# Patient Record
Sex: FEMALE | Race: WHITE | Employment: FULL TIME | URBAN - METROPOLITAN AREA
[De-identification: names, ages, dates, MRNs, and addresses within clinical notes are randomized per-mention and may not be internally consistent; named-entity substitution may affect disease eponyms.]

---

## 2018-03-17 ENCOUNTER — HOSPITAL ENCOUNTER (OUTPATIENT)
Dept: PHYSICAL THERAPY | Age: 38
Discharge: HOME | End: 2018-03-31
Attending: FAMILY MEDICINE | Admitting: FAMILY MEDICINE

## 2018-04-01 ENCOUNTER — HOSPITAL ENCOUNTER (OUTPATIENT)
Dept: OTHER | Age: 38
Discharge: HOME | End: 2018-04-30
Attending: FAMILY MEDICINE | Admitting: FAMILY MEDICINE

## 2020-06-11 ENCOUNTER — HOSPITAL ENCOUNTER (OUTPATIENT)
Dept: WOMENS IMAGING | Age: 40
Discharge: HOME | End: 2020-06-11
Payer: COMMERCIAL

## 2020-06-11 PROCEDURE — 77067 SCR MAMMO BI INCL CAD: CPT

## 2020-08-26 ENCOUNTER — OFFICE VISIT (OUTPATIENT)
Dept: FAMILY MEDICINE CLINIC | Age: 40
End: 2020-08-26
Payer: COMMERCIAL

## 2020-08-26 VITALS
WEIGHT: 213.2 LBS | DIASTOLIC BLOOD PRESSURE: 82 MMHG | BODY MASS INDEX: 36.4 KG/M2 | TEMPERATURE: 98.1 F | HEART RATE: 76 BPM | HEIGHT: 64 IN | SYSTOLIC BLOOD PRESSURE: 118 MMHG | OXYGEN SATURATION: 98 %

## 2020-08-26 PROCEDURE — 99203 OFFICE O/P NEW LOW 30 MIN: CPT | Performed by: NURSE PRACTITIONER

## 2020-08-26 ASSESSMENT — PATIENT HEALTH QUESTIONNAIRE - PHQ9
2. FEELING DOWN, DEPRESSED OR HOPELESS: 0
SUM OF ALL RESPONSES TO PHQ9 QUESTIONS 1 & 2: 0
SUM OF ALL RESPONSES TO PHQ QUESTIONS 1-9: 0
1. LITTLE INTEREST OR PLEASURE IN DOING THINGS: 0
SUM OF ALL RESPONSES TO PHQ QUESTIONS 1-9: 0

## 2020-08-26 ASSESSMENT — ENCOUNTER SYMPTOMS
RESPIRATORY NEGATIVE: 1
ALLERGIC/IMMUNOLOGIC NEGATIVE: 1
EYES NEGATIVE: 1
CONSTIPATION: 1

## 2020-08-26 NOTE — PROGRESS NOTES
2020    Karel York (:  1980) is a 38 y.o. female, here for evaluation of the following medical concerns:    Patient is here to establish care. She is a , mother of 3 with children ranging from 7 to 13 years. History of hemorrhoidectomy 5 years ago and has no complaints with hemorrhoids since then. History of  with her last child due to nuchal cord complications and low heart rate. She has experienced 4 miscarriages with unknown causes. History of parasitic infection after going to [facility]. Digestive issues for years after. Does not have regular bowel movements, goes 1 to 2 times weekly. Tried multiple stool softners with no effect. Reports seeing GI doctor, unable to remember which one. Had colonoscopy, negative findings. Patient concerned about mental health issues and substance abuse issues in family. Father was addicted to crack, was clean for 11 years and then  of Lung Cancer, unknown type. Has a counselor after father passed away now that she sees in a neighboring town that she is happy with. Patient experienced post partum depression after last child and was prescribed Effexor and Trazodone from her last PCP to help with insomnia. Irregular menstraul cycle every 3 to 5 weeks lasting about 4 days. Denies heavy bleeding or pain with periods. Does report \"mood swings\" before period is to begin. Reports she has not had a pap smear since her last child was born, patient will be scheduling an appointment for a pap smear. Reports they appear to be further apart. Does not have current gynecologist.       Review of Systems   Constitutional: Negative. HENT: Negative. Eyes: Negative. Respiratory: Negative. Cardiovascular: Negative. Gastrointestinal: Positive for constipation (Chronic constipation). Endocrine: Negative. Genitourinary: Positive for menstrual problem (Irregular periods. ). Musculoskeletal: Negative. Skin: Negative. Allergic/Immunologic: Negative. Neurological: Negative. Hematological: Negative. Psychiatric/Behavioral:        History of chronic depression and OCD. Prior to Visit Medications    Medication Sig Taking?  Authorizing Provider   venlafaxine (EFFEXOR XR) 150 MG extended release capsule TAKE ONE CAPSULE BY MOUTH EVERY DAY Yes Boni Raymundo PA-C   traZODone (DESYREL) 50 MG tablet TAKE 1/2-1 TABLET BY MOUTH AT BEDTIME Yes Boni Raymundo PA-C        No Known Allergies    Past Medical History:   Diagnosis Date    Clinical depression     Hyperlipidemia 04/2014        Insomnia     Mental disorder     depression    OCD (obsessive compulsive disorder)     Postpartum depression     after second child       Past Surgical History:   Procedure Laterality Date    HEMORRHOID SURGERY      TONSILLECTOMY         Social History     Socioeconomic History    Marital status:      Spouse name: Not on file    Number of children: Not on file    Years of education: Not on file    Highest education level: Not on file   Occupational History    Not on file   Social Needs    Financial resource strain: Not on file    Food insecurity     Worry: Not on file     Inability: Not on file    Transportation needs     Medical: Not on file     Non-medical: Not on file   Tobacco Use    Smoking status: Former Smoker    Smokeless tobacco: Never Used   Substance and Sexual Activity    Alcohol use: No    Drug use: No    Sexual activity: Yes   Lifestyle    Physical activity     Days per week: Not on file     Minutes per session: Not on file    Stress: Not on file   Relationships    Social connections     Talks on phone: Not on file     Gets together: Not on file     Attends [employer] service: Not on file     Active member of club or organization: Not on file     Attends meetings of clubs or organizations: Not on file     Relationship status: Not on file    Intimate partner violence     Fear of current or ex Cardiovascular:      Rate and Rhythm: Normal rate and regular rhythm. Pulses: Normal pulses. Heart sounds: Normal heart sounds. Pulmonary:      Effort: Pulmonary effort is normal.      Breath sounds: Normal breath sounds. Abdominal:      General: Bowel sounds are normal.      Palpations: Abdomen is soft. Musculoskeletal: Normal range of motion. Skin:     General: Skin is warm and dry. Neurological:      General: No focal deficit present. Mental Status: She is alert and oriented to person, place, and time. Psychiatric:         Attention and Perception: Attention and perception normal.         Mood and Affect: Mood and affect normal.         Speech: Speech normal.         Behavior: Behavior normal. Behavior is cooperative. Thought Content: Thought content normal.         Judgment: Judgment normal.         ASSESSMENT/PLAN:  1. Encounter to establish care    2. Depression, unspecified depression type    3. Mood swings  - FOLLICLE STIMULATING HORMONE; Future  - LUTEINIZING HORMONE; Future    4. Irregular periods/menstrual cycles  - CBC; Future  - COMPREHENSIVE METABOLIC PANEL; Future  - TSH without Reflex; Future  - FOLLICLE STIMULATING HORMONE; Future  - LUTEINIZING HORMONE; Future    5. Screening for cholesterol level  - LIPID PANEL; Future    Will return for female exam, pap smear  Will also have labs drawn at the time of female exam    Return in about 6 weeks (around 10/7/2020). An  electronic signature was used to authenticate this note.     --Dipika Batch on 8/26/2020 at 3:42 PM

## 2020-08-28 ENCOUNTER — OFFICE VISIT (OUTPATIENT)
Dept: FAMILY MEDICINE CLINIC | Age: 40
End: 2020-08-28
Payer: COMMERCIAL

## 2020-08-28 VITALS
DIASTOLIC BLOOD PRESSURE: 76 MMHG | TEMPERATURE: 98.2 F | HEIGHT: 64 IN | HEART RATE: 70 BPM | BODY MASS INDEX: 36.02 KG/M2 | SYSTOLIC BLOOD PRESSURE: 110 MMHG | OXYGEN SATURATION: 98 % | WEIGHT: 211 LBS

## 2020-08-28 LAB
A/G RATIO: 1.7 (ref 1.1–2.2)
ALBUMIN SERPL-MCNC: 4.7 G/DL (ref 3.4–5)
ALP BLD-CCNC: 80 U/L (ref 40–129)
ALT SERPL-CCNC: 26 U/L (ref 10–40)
ANION GAP SERPL CALCULATED.3IONS-SCNC: 13 MMOL/L (ref 3–16)
AST SERPL-CCNC: 25 U/L (ref 15–37)
BILIRUB SERPL-MCNC: 0.3 MG/DL (ref 0–1)
BUN BLDV-MCNC: 13 MG/DL (ref 7–20)
CALCIUM SERPL-MCNC: 10.2 MG/DL (ref 8.3–10.6)
CHLORIDE BLD-SCNC: 101 MMOL/L (ref 99–110)
CHOLESTEROL, TOTAL: 224 MG/DL (ref 0–199)
CO2: 26 MMOL/L (ref 21–32)
CREAT SERPL-MCNC: 0.9 MG/DL (ref 0.6–1.1)
FOLLICLE STIMULATING HORMONE: 5.6 MIU/ML
GFR AFRICAN AMERICAN: >60
GFR NON-AFRICAN AMERICAN: >60
GLOBULIN: 2.8 G/DL
GLUCOSE BLD-MCNC: 104 MG/DL (ref 70–99)
HCT VFR BLD CALC: 44.1 % (ref 36–48)
HDLC SERPL-MCNC: 46 MG/DL (ref 40–60)
HEMOGLOBIN: 14.6 G/DL (ref 12–16)
LDL CHOLESTEROL CALCULATED: 153 MG/DL
LUTEINIZING HORMONE: 9.7 MIU/ML
MCH RBC QN AUTO: 29.8 PG (ref 26–34)
MCHC RBC AUTO-ENTMCNC: 33.2 G/DL (ref 31–36)
MCV RBC AUTO: 89.9 FL (ref 80–100)
PDW BLD-RTO: 14.3 % (ref 12.4–15.4)
PLATELET # BLD: 227 K/UL (ref 135–450)
PLATELET SLIDE REVIEW: NORMAL
PMV BLD AUTO: 9.7 FL (ref 5–10.5)
POTASSIUM SERPL-SCNC: 4 MMOL/L (ref 3.5–5.1)
RBC # BLD: 4.9 M/UL (ref 4–5.2)
SODIUM BLD-SCNC: 140 MMOL/L (ref 136–145)
TOTAL PROTEIN: 7.5 G/DL (ref 6.4–8.2)
TRIGL SERPL-MCNC: 125 MG/DL (ref 0–150)
TSH SERPL DL<=0.05 MIU/L-ACNC: 1.78 UIU/ML (ref 0.27–4.2)
VLDLC SERPL CALC-MCNC: 25 MG/DL
WBC # BLD: 5.7 K/UL (ref 4–11)

## 2020-08-28 PROCEDURE — 99396 PREV VISIT EST AGE 40-64: CPT | Performed by: NURSE PRACTITIONER

## 2020-08-28 ASSESSMENT — ENCOUNTER SYMPTOMS
GASTROINTESTINAL NEGATIVE: 1
CHEST TIGHTNESS: 0
SHORTNESS OF BREATH: 0
WHEEZING: 0
COUGH: 0

## 2020-08-28 NOTE — PROGRESS NOTES
Marcos Oviedo  1980  38 y.o. SUBJECT JANES:    Chief Complaint   Patient presents with   Penelope Osman is a 38year old female who is in for a female exam. She denies painful intercourse, unusual vaginal discharge, breasts tenderness or nipple discharge. She states she does monthly breasts self exams. She states her current medications are working well for her depression. She is complaining of bilateral ankle and foot pain. She states she has noted the discomfort at bedtime, usually after doing her evening walk. She states she has a long history of gymnastics, both in school and competitive. She denies injury to her ankles or feet. She states she was told that she has an extra bone, inner feet, at the arch. She states getting up and walking around, stretching helps the discomfort. The discomfort is not daily. She has not tried anti-inflammatory medication or stretches. Current Outpatient Medications on File Prior to Visit   Medication Sig Dispense Refill    venlafaxine (EFFEXOR XR) 150 MG extended release capsule TAKE ONE CAPSULE BY MOUTH EVERY DAY 30 capsule 3    traZODone (DESYREL) 50 MG tablet TAKE 1/2-1 TABLET BY MOUTH AT BEDTIME 30 tablet 3     No current facility-administered medications on file prior to visit.         Past Medical History:   Diagnosis Date    Clinical depression     Hyperlipidemia 04/2014        Insomnia     Mental disorder     depression    OCD (obsessive compulsive disorder)     Postpartum depression     after second child     Past Surgical History:   Procedure Laterality Date    HEMORRHOID SURGERY      TONSILLECTOMY       Family History   Problem Relation Age of Onset    Arthritis Mother     High Cholesterol Mother     Thyroid Disease Mother     Depression Father     Mental Illness Father     Substance Abuse Father     High Blood Pressure Maternal Grandmother     Arthritis Maternal Grandfather     Diabetes Maternal Grandfather  High Blood Pressure Maternal Grandfather     Cancer Paternal Grandmother     High Blood Pressure Paternal Grandmother     High Blood Pressure Paternal Grandfather     High Cholesterol Paternal Grandfather      Social History     Socioeconomic History    Marital status:      Spouse name: Not on file    Number of children: Not on file    Years of education: Not on file    Highest education level: Not on file   Occupational History    Not on file   Social Needs    Financial resource strain: Not on file    Food insecurity     Worry: Not on file     Inability: Not on file   [employer] needs     Medical: Not on file     Non-medical: Not on file   Tobacco Use    Smoking status: Former Smoker    Smokeless tobacco: Never Used   Substance and Sexual Activity    Alcohol use: No    Drug use: No    Sexual activity: Yes   Lifestyle    Physical activity     Days per week: Not on file     Minutes per session: Not on file    Stress: Not on file   Relationships    Social connections     Talks on phone: Not on file     Gets together: Not on file     Attends [employer] service: Not on file     Active member of club or organization: Not on file     Attends meetings of clubs or organizations: Not on file     Relationship status: Not on file    Intimate partner violence     Fear of current or ex partner: Not on file     Emotionally abused: Not on file     Physically abused: Not on file     Forced sexual activity: Not on file   Other Topics Concern    Not on file   Social History Narrative    Not on file       Review of Systems   Constitutional: Negative for activity change, appetite change, chills, diaphoresis, fatigue, fever and unexpected weight change. HENT: Negative. Respiratory: Negative for cough, chest tightness, shortness of breath and wheezing. Cardiovascular: Negative for chest pain and palpitations. Gastrointestinal: Negative.     Genitourinary: Positive for menstrual problem (irregular). Negative for difficulty urinating, dyspareunia, dysuria, enuresis, flank pain, frequency, genital sores, pelvic pain, vaginal bleeding, vaginal discharge and vaginal pain. Neurological: Negative. Psychiatric/Behavioral: Negative. OBJECTIVE:     /76 (Site: Right Upper Arm, Position: Sitting, Cuff Size: Large Adult)   Pulse 70   Temp 98.2 °F (36.8 °C)   Ht 5' 4\" (1.626 m)   Wt 211 lb (95.7 kg)   SpO2 98%   BMI 36.22 kg/m²     Physical Exam  Vitals signs reviewed. Exam conducted with a chaperone present. Constitutional:       General: She is not in acute distress. Appearance: Normal appearance. She is well-developed. She is not ill-appearing or diaphoretic. HENT:      Head: Normocephalic and atraumatic. Right Ear: External ear normal.      Left Ear: External ear normal.   Eyes:      General: No scleral icterus. Conjunctiva/sclera: Conjunctivae normal.      Pupils: Pupils are equal, round, and reactive to light. Neck:      Musculoskeletal: Normal range of motion and neck supple. No neck rigidity or muscular tenderness. Cardiovascular:      Rate and Rhythm: Normal rate and regular rhythm. Heart sounds: Normal heart sounds. Pulmonary:      Effort: Pulmonary effort is normal.      Breath sounds: Normal breath sounds. Abdominal:      General: Abdomen is flat. Bowel sounds are normal. There is no distension. Palpations: Abdomen is soft. There is no mass. Tenderness: There is no abdominal tenderness. Genitourinary:     General: Normal vulva. Exam position: Lithotomy position. Pubic Area: No rash or pubic lice. Labia:         Right: No tenderness. Left: No tenderness. Vagina: No vaginal discharge. Comments: Small amount of bleeding with sampling endocervical.  Musculoskeletal: Normal range of motion. Lymphadenopathy:      Cervical: No cervical adenopathy. Lower Body: No right inguinal adenopathy.  No left inguinal adenopathy. Skin:     General: Skin is warm and dry. Neurological:      Mental Status: She is alert and oriented to person, place, and time. Psychiatric:         Behavior: Behavior normal.         Thought Content: Thought content normal.         Judgment: Judgment normal.         No results found for requested labs within last 30 days. Hemoglobin A1C (%)   Date Value   04/15/2015 5.6     LDL Calculated (mg/dL)   Date Value   04/15/2014 144       Lab Results   Component Value Date    WBC 5.9 09/15/2015    WBC 12.7 06/27/2013    WBC 11.6 06/26/2013    NEUTROABS 3.7 09/15/2015    HGB 13.0 09/15/2015    HGB 7.5 06/27/2013    HGB 10.4 06/26/2013    HCT 38.5 09/15/2015    HCT 23.0 06/27/2013    HCT 31.6 06/26/2013    MCV 78 09/15/2015    MCV 77.3 06/27/2013    MCV 76.9 06/26/2013     09/15/2015     06/27/2013     06/26/2013    SEGSABS 9.7 06/27/2013    SEGSABS 10.7 03/27/2013    SEGSABS 7.0 12/04/2011    LYMPHSABS 1.8 09/15/2015    MONOSABS 0.3 09/15/2015    EOSABS 0.1 09/15/2015    BASOSABS 0.0 09/15/2015     Lab Results   Component Value Date    TSH 1.680 09/15/2015     Lab Results   Component Value Date    LABALBU 4.6 09/15/2015    BILITOT 0.4 09/15/2015    AST 17 09/15/2015    ALT 13 09/15/2015    ALKPHOS 96 09/15/2015             No results found for this visit on 08/28/20. ASSESSMENT AND PLAN:     1. Encounter for gynecological examination with Papanicolaou smear of cervix  - PAP SMEAR    2. Chronic ankle pain, bilateral    3. Mood swings  - FOLLICLE STIMULATING HORMONE  - LUTEINIZING HORMONE    4. Irregular periods/menstrual cycles  - FOLLICLE STIMULATING HORMONE  - LUTEINIZING HORMONE  - TSH without Reflex  - COMPREHENSIVE METABOLIC PANEL  - CBC    5.  Depression, unspecified depression type  - TSH without Reflex  - COMPREHENSIVE METABOLIC PANEL    6. Screening for cholesterol level  - LIPID PANEL    Fluids, rest  Consider walking in more supportive shoe  Foot soaks after

## 2020-09-01 ENCOUNTER — TELEPHONE (OUTPATIENT)
Dept: FAMILY MEDICINE CLINIC | Age: 40
End: 2020-09-01

## 2020-09-01 NOTE — TELEPHONE ENCOUNTER
Called patient regarding lab results. Advised to decrease red meat, walk at least 30 minutes 5 days a week. Verbalized understanding and agreement with plan.

## 2020-09-18 RX ORDER — DOCUSATE SODIUM 100 MG/1
100 CAPSULE, LIQUID FILLED ORAL 2 TIMES DAILY
Qty: 60 CAPSULE | Refills: 0 | Status: SHIPPED | OUTPATIENT
Start: 2020-09-18 | End: 2020-10-18

## 2020-11-16 RX ORDER — TRAZODONE HYDROCHLORIDE 50 MG/1
TABLET ORAL
Qty: 90 TABLET | Refills: 0 | Status: SHIPPED | OUTPATIENT
Start: 2020-11-16 | End: 2021-01-11 | Stop reason: SDUPTHER

## 2020-11-16 RX ORDER — VENLAFAXINE HYDROCHLORIDE 150 MG/1
CAPSULE, EXTENDED RELEASE ORAL
Qty: 90 CAPSULE | Refills: 0 | Status: SHIPPED | OUTPATIENT
Start: 2020-11-16 | End: 2021-01-11 | Stop reason: SDUPTHER

## 2020-11-17 RX ORDER — DOCUSATE SODIUM 100 MG/1
100 CAPSULE, LIQUID FILLED ORAL 2 TIMES DAILY PRN
Qty: 30 CAPSULE | Refills: 0 | Status: SHIPPED | OUTPATIENT
Start: 2020-11-17 | End: 2020-12-02

## 2021-01-11 ENCOUNTER — OFFICE VISIT (OUTPATIENT)
Dept: FAMILY MEDICINE CLINIC | Age: 41
End: 2021-01-11
Payer: COMMERCIAL

## 2021-01-11 VITALS
WEIGHT: 201.8 LBS | SYSTOLIC BLOOD PRESSURE: 118 MMHG | HEART RATE: 90 BPM | DIASTOLIC BLOOD PRESSURE: 80 MMHG | OXYGEN SATURATION: 98 % | BODY MASS INDEX: 34.64 KG/M2 | TEMPERATURE: 96.9 F

## 2021-01-11 DIAGNOSIS — F32.A ANXIETY AND DEPRESSION: Primary | ICD-10-CM

## 2021-01-11 DIAGNOSIS — K59.00 CONSTIPATION, UNSPECIFIED CONSTIPATION TYPE: ICD-10-CM

## 2021-01-11 DIAGNOSIS — Z76.0 MEDICATION REFILL: ICD-10-CM

## 2021-01-11 DIAGNOSIS — F41.9 ANXIETY AND DEPRESSION: Primary | ICD-10-CM

## 2021-01-11 PROCEDURE — 99213 OFFICE O/P EST LOW 20 MIN: CPT | Performed by: NURSE PRACTITIONER

## 2021-01-11 RX ORDER — TRAZODONE HYDROCHLORIDE 50 MG/1
TABLET ORAL
Qty: 90 TABLET | Refills: 1 | Status: SHIPPED | OUTPATIENT
Start: 2021-01-11 | End: 2021-06-25 | Stop reason: SDUPTHER

## 2021-01-11 RX ORDER — VENLAFAXINE HYDROCHLORIDE 150 MG/1
CAPSULE, EXTENDED RELEASE ORAL
Qty: 90 CAPSULE | Refills: 1 | Status: SHIPPED | OUTPATIENT
Start: 2021-01-11 | End: 2021-06-25 | Stop reason: SDUPTHER

## 2021-01-11 ASSESSMENT — ENCOUNTER SYMPTOMS
TROUBLE SWALLOWING: 0
COUGH: 0
ABDOMINAL PAIN: 0
DIARRHEA: 0
NAUSEA: 0
CONSTIPATION: 1
WHEEZING: 0
ANAL BLEEDING: 0
SORE THROAT: 0
SHORTNESS OF BREATH: 0
RECTAL PAIN: 0
BLOOD IN STOOL: 0
RHINORRHEA: 0
CHOKING: 0
CHEST TIGHTNESS: 0

## 2021-01-11 ASSESSMENT — PATIENT HEALTH QUESTIONNAIRE - PHQ9
SUM OF ALL RESPONSES TO PHQ QUESTIONS 1-9: 0
SUM OF ALL RESPONSES TO PHQ9 QUESTIONS 1 & 2: 0
2. FEELING DOWN, DEPRESSED OR HOPELESS: 0
1. LITTLE INTEREST OR PLEASURE IN DOING THINGS: 0
SUM OF ALL RESPONSES TO PHQ QUESTIONS 1-9: 0

## 2021-01-11 NOTE — PROGRESS NOTES
Eugenio Serrano  1980  38 y.o. SUBJECT JANES:    Chief Complaint   Patient presents with    Medication Refill       HPI    Maegan Larson is a 38year old female who is in for follow up. She states she has been on the antidepressants for almost 10 years and the treatments are working well. She states her problem of constipation is still a concern. She states she has had a problem for some time. She states she has used stool softeners without relief, many times the stool is a large amount, causing fissures and hemorrhoids. She denies abdominal pain, nausea or vomiting. She gives a past history of having a parasite in her intestine in 2004 that she contracted while in [place]. She states she had antibiotic treatment. She states she had a colonoscopy a few years ago and no abnormalities were found. Current Outpatient Medications on File Prior to Visit   Medication Sig Dispense Refill    Probiotic Product (PROBIOTIC PO) Take by mouth daily       No current facility-administered medications on file prior to visit.         Past Medical History:   Diagnosis Date    Clinical depression     Hyperlipidemia 04/2014        Insomnia     Mental disorder     depression    OCD (obsessive compulsive disorder)     Postpartum depression     after second child     Past Surgical History:   Procedure Laterality Date    HEMORRHOID SURGERY      TONSILLECTOMY       Family History   Problem Relation Age of Onset    Arthritis Mother     High Cholesterol Mother     Thyroid Disease Mother     Depression Father     Mental Illness Father     Substance Abuse Father     High Blood Pressure Maternal Grandmother     Arthritis Maternal Grandfather     Diabetes Maternal Grandfather     High Blood Pressure Maternal Grandfather     Cancer Paternal Grandmother     High Blood Pressure Paternal Grandmother     High Blood Pressure Paternal Grandfather     High Cholesterol Paternal Grandfather      Social History Socioeconomic History    Marital status:      Spouse name: Not on file    Number of children: Not on file    Years of education: Not on file    Highest education level: Not on file   Occupational History    Not on file   Social Needs    Financial resource strain: Not on file    Food insecurity     Worry: Not on file     Inability: Not on file    Transportation needs     Medical: Not on file     Non-medical: Not on file   Tobacco Use    Smoking status: Former Smoker    Smokeless tobacco: Never Used   Substance and Sexual Activity    Alcohol use: No    Drug use: No    Sexual activity: Yes   Lifestyle    Physical activity     Days per week: Not on file     Minutes per session: Not on file    Stress: Not on file   Relationships    Social connections     Talks on phone: Not on file     Gets together: Not on file     Attends [employer] service: Not on file     Active member of club or organization: Not on file     Attends meetings of clubs or organizations: Not on file     Relationship status: Not on file    Intimate partner violence     Fear of current or ex partner: Not on file     Emotionally abused: Not on file     Physically abused: Not on file     Forced sexual activity: Not on file   Other Topics Concern    Not on file   Social History Narrative    Not on file       Review of Systems   Constitutional: Negative for activity change, appetite change, chills, diaphoresis, fatigue, fever and unexpected weight change. HENT: Negative for congestion, rhinorrhea, sore throat and trouble swallowing. Respiratory: Negative for cough, choking, chest tightness, shortness of breath and wheezing. Cardiovascular: Negative for chest pain, palpitations and leg swelling. Gastrointestinal: Positive for constipation. Negative for abdominal pain, anal bleeding, blood in stool, diarrhea, nausea and rectal pain. Genitourinary: Negative. Musculoskeletal: Negative. Skin: Negative. Neurological: Negative. Psychiatric/Behavioral: Negative. OBJECTIVE:     /80 (Site: Right Upper Arm, Position: Sitting, Cuff Size: Medium Adult)   Pulse 90   Temp 96.9 °F (36.1 °C) (Infrared)   Wt 201 lb 12.8 oz (91.5 kg)   SpO2 98%   BMI 34.64 kg/m²     Physical Exam  Vitals signs reviewed. Constitutional:       General: She is not in acute distress. Appearance: Normal appearance. She is well-developed. She is not ill-appearing or diaphoretic. HENT:      Head: Normocephalic and atraumatic. Eyes:      General: No scleral icterus. Conjunctiva/sclera: Conjunctivae normal.      Pupils: Pupils are equal, round, and reactive to light. Neck:      Musculoskeletal: Normal range of motion and neck supple. No neck rigidity or muscular tenderness. Cardiovascular:      Rate and Rhythm: Normal rate and regular rhythm. Heart sounds: Normal heart sounds. No murmur. No friction rub. No gallop. Pulmonary:      Effort: Pulmonary effort is normal. No respiratory distress. Breath sounds: Normal breath sounds. No stridor. No wheezing. Abdominal:      Palpations: Abdomen is soft. Musculoskeletal: Normal range of motion. Right lower leg: No edema. Left lower leg: No edema. Lymphadenopathy:      Cervical: No cervical adenopathy. Skin:     General: Skin is warm and dry. Neurological:      Mental Status: She is alert and oriented to person, place, and time. Psychiatric:         Behavior: Behavior normal.         Thought Content: Thought content normal.         Judgment: Judgment normal.         No results found for requested labs within last 30 days.      Hemoglobin A1C (%)   Date Value   04/15/2015 5.6     LDL Calculated (mg/dL)   Date Value   08/28/2020 153 (H)       Lab Results   Component Value Date    WBC 5.7 08/28/2020    WBC 5.9 09/15/2015    WBC 12.7 06/27/2013    NEUTROABS 3.7 09/15/2015    HGB 14.6 08/28/2020    HGB 13.0 09/15/2015    HGB 7.5 06/27/2013 HCT 44.1 08/28/2020    HCT 38.5 09/15/2015    HCT 23.0 06/27/2013    MCV 89.9 08/28/2020    MCV 78 09/15/2015    MCV 77.3 06/27/2013     08/28/2020     09/15/2015     06/27/2013    SEGSABS 9.7 06/27/2013    SEGSABS 10.7 03/27/2013    SEGSABS 7.0 12/04/2011    LYMPHSABS 1.8 09/15/2015    MONOSABS 0.3 09/15/2015    EOSABS 0.1 09/15/2015    BASOSABS 0.0 09/15/2015     Lab Results   Component Value Date    TSH 1.78 08/28/2020     Lab Results   Component Value Date    LABALBU 4.7 08/28/2020    BILITOT 0.3 08/28/2020    AST 25 08/28/2020    ALT 26 08/28/2020    ALKPHOS 80 08/28/2020             No results found for this visit on 01/11/21. ASSESSMENT AND PLAN:     1. Anxiety and depression  - stable  - traZODone (DESYREL) 50 MG tablet; TAKE 1/2-1 TABLET BY MOUTH AT BEDTIME  Dispense: 90 tablet; Refill: 1  - venlafaxine (EFFEXOR XR) 150 MG extended release capsule; TAKE ONE CAPSULE BY MOUTH EVERY DAY  Dispense: 90 capsule; Refill: 1    2. Medication refill  - traZODone (DESYREL) 50 MG tablet; TAKE 1/2-1 TABLET BY MOUTH AT BEDTIME  Dispense: 90 tablet; Refill: 1  - venlafaxine (EFFEXOR XR) 150 MG extended release capsule; TAKE ONE CAPSULE BY MOUTH EVERY DAY  Dispense: 90 capsule; Refill: 1    3. Constipation, unspecified constipation type  - continue probiotic  - will try Miralax and follow with daily stool softener  - increase fiber in diet to at least 25 grams a day  - if no resolve will refer to GI    Verbalized understanding and agreement with plan      Return in about 6 months (around 7/11/2021). Care discussed with patient. Patient educated on signs and symptoms of exacerbation and when to seek further medical attention. Advised to call for any problems, questions, or concerns. Patient verbalizes understanding and agrees with plan. Medications reviewed and reconciled. Continue current medications. Appropriate prescriptions are ordered. Risks and benefits of meds are discussed.      After visit summary provided.

## 2021-06-25 ENCOUNTER — OFFICE VISIT (OUTPATIENT)
Dept: FAMILY MEDICINE CLINIC | Age: 41
End: 2021-06-25
Payer: COMMERCIAL

## 2021-06-25 VITALS
TEMPERATURE: 97.6 F | DIASTOLIC BLOOD PRESSURE: 76 MMHG | HEIGHT: 64 IN | BODY MASS INDEX: 34.62 KG/M2 | OXYGEN SATURATION: 98 % | HEART RATE: 75 BPM | SYSTOLIC BLOOD PRESSURE: 118 MMHG | WEIGHT: 202.8 LBS

## 2021-06-25 DIAGNOSIS — F41.9 ANXIETY AND DEPRESSION: ICD-10-CM

## 2021-06-25 DIAGNOSIS — F32.A ANXIETY AND DEPRESSION: ICD-10-CM

## 2021-06-25 DIAGNOSIS — K59.00 CONSTIPATION, UNSPECIFIED CONSTIPATION TYPE: Primary | ICD-10-CM

## 2021-06-25 DIAGNOSIS — Z76.0 MEDICATION REFILL: ICD-10-CM

## 2021-06-25 PROCEDURE — 99213 OFFICE O/P EST LOW 20 MIN: CPT | Performed by: NURSE PRACTITIONER

## 2021-06-25 PROCEDURE — G8417 CALC BMI ABV UP PARAM F/U: HCPCS | Performed by: NURSE PRACTITIONER

## 2021-06-25 PROCEDURE — 1036F TOBACCO NON-USER: CPT | Performed by: NURSE PRACTITIONER

## 2021-06-25 PROCEDURE — G8427 DOCREV CUR MEDS BY ELIG CLIN: HCPCS | Performed by: NURSE PRACTITIONER

## 2021-06-25 RX ORDER — TRAZODONE HYDROCHLORIDE 50 MG/1
TABLET ORAL
Qty: 90 TABLET | Refills: 1 | Status: SHIPPED | OUTPATIENT
Start: 2021-06-25

## 2021-06-25 RX ORDER — BIOTIN 2500 MCG
1 CAPSULE ORAL DAILY
COMMUNITY

## 2021-06-25 RX ORDER — VENLAFAXINE HYDROCHLORIDE 150 MG/1
CAPSULE, EXTENDED RELEASE ORAL
Qty: 90 CAPSULE | Refills: 1 | Status: SHIPPED | OUTPATIENT
Start: 2021-06-25

## 2021-06-25 ASSESSMENT — PATIENT HEALTH QUESTIONNAIRE - PHQ9
8. MOVING OR SPEAKING SO SLOWLY THAT OTHER PEOPLE COULD HAVE NOTICED. OR THE OPPOSITE, BEING SO FIGETY OR RESTLESS THAT YOU HAVE BEEN MOVING AROUND A LOT MORE THAN USUAL: 0
1. LITTLE INTEREST OR PLEASURE IN DOING THINGS: 0
9. THOUGHTS THAT YOU WOULD BE BETTER OFF DEAD, OR OF HURTING YOURSELF: 0
10. IF YOU CHECKED OFF ANY PROBLEMS, HOW DIFFICULT HAVE THESE PROBLEMS MADE IT FOR YOU TO DO YOUR WORK, TAKE CARE OF THINGS AT HOME, OR GET ALONG WITH OTHER PEOPLE: 0
SUM OF ALL RESPONSES TO PHQ QUESTIONS 1-9: 4
7. TROUBLE CONCENTRATING ON THINGS, SUCH AS READING THE NEWSPAPER OR WATCHING TELEVISION: 0
SUM OF ALL RESPONSES TO PHQ QUESTIONS 1-9: 4
SUM OF ALL RESPONSES TO PHQ9 QUESTIONS 1 & 2: 0
6. FEELING BAD ABOUT YOURSELF - OR THAT YOU ARE A FAILURE OR HAVE LET YOURSELF OR YOUR FAMILY DOWN: 0
5. POOR APPETITE OR OVEREATING: 2
3. TROUBLE FALLING OR STAYING ASLEEP: 1
4. FEELING TIRED OR HAVING LITTLE ENERGY: 1
SUM OF ALL RESPONSES TO PHQ QUESTIONS 1-9: 4
2. FEELING DOWN, DEPRESSED OR HOPELESS: 0

## 2021-06-25 ASSESSMENT — ENCOUNTER SYMPTOMS
SHORTNESS OF BREATH: 0
GASTROINTESTINAL NEGATIVE: 1
CHEST TIGHTNESS: 0
COUGH: 0
WHEEZING: 0

## 2021-06-25 NOTE — PROGRESS NOTES
Michael Kiara  1980  42 y.o. SUBJECT JANES:    Chief Complaint   Patient presents with   Deneen Mcghee       MIN Cheng is a 42year old female who is in  For follow up. She states medications continue to help with anxiety and sleep problems. She states she is walking most mornings which has also helped with starting her day, less anxious. She has been monitoring the amount of red meat, decreased and is eating more fruits and vegetables. She states the constipation is much less with the changes in foods. She denies chest pain, shortness of breath, abdominal pain, nausea or blood in stools. Current Outpatient Medications on File Prior to Visit   Medication Sig Dispense Refill    Multiple Vitamin (MULTI-DAY VITAMINS PO) Take 1 tablet by mouth daily      Biotin 2500 MCG CAPS Take 1 capsule by mouth daily       No current facility-administered medications on file prior to visit.        Past Medical History:   Diagnosis Date    Clinical depression     Hyperlipidemia 04/2014        Insomnia     Mental disorder     depression    OCD (obsessive compulsive disorder)     Postpartum depression     after second child     Past Surgical History:   Procedure Laterality Date    HEMORRHOID SURGERY      TONSILLECTOMY       Family History   Problem Relation Age of Onset    Arthritis Mother     High Cholesterol Mother     Thyroid Disease Mother     Depression Father     Mental Illness Father     Substance Abuse Father     High Blood Pressure Maternal Grandmother     Arthritis Maternal Grandfather     Diabetes Maternal Grandfather     High Blood Pressure Maternal Grandfather     Cancer Paternal Grandmother     High Blood Pressure Paternal Grandmother     High Blood Pressure Paternal Grandfather     High Cholesterol Paternal Grandfather      Social History     Socioeconomic History    Marital status:      Spouse name: Not on file    Number of children: Not on file    Years of education: Not on file    Highest education level: Not on file   Occupational History    Not on file   Tobacco Use    Smoking status: Former Smoker    Smokeless tobacco: Never Used   Substance and Sexual Activity    Alcohol use: No    Drug use: No    Sexual activity: Yes   Other Topics Concern    Not on file   Social History Narrative    Not on file     Social Determinants of Health     Financial Resource Strain:     Difficulty of Paying Living Expenses:    Food Insecurity:     Worried About Running Out of Food in the Last Year:     [address] in the Last Year:    Transportation Needs:     Lack of Transportation (Medical):  Lack of Transportation (Non-Medical):    Physical Activity:     Days of Exercise per Week:     Minutes of Exercise per Session:    Stress:     Feeling of Stress :    Social Connections:     Frequency of Communication with Friends and Family:     Frequency of Social Gatherings with Friends and Family:     Attends Mandaen Services:     Active Member of Clubs or Organizations:     Attends Club or Organization Meetings:     Marital Status:    Intimate Partner Violence:     Fear of Current or Ex-Partner:     Emotionally Abused:     Physically Abused:     Sexually Abused:        Review of Systems   Constitutional: Negative for activity change, appetite change, chills, diaphoresis, fatigue, fever and unexpected weight change. Respiratory: Negative for cough, chest tightness, shortness of breath and wheezing. Cardiovascular: Negative for chest pain and palpitations. Gastrointestinal: Negative. Genitourinary: Negative. Musculoskeletal: Negative. Psychiatric/Behavioral: Negative. OBJECTIVE:     /76   Pulse 75   Temp 97.6 °F (36.4 °C) (Infrared)   Ht 5' 4\" (1.626 m)   Wt 202 lb 12.8 oz (92 kg)   LMP 06/15/2021 (Within Days)   SpO2 98%   Breastfeeding No   BMI 34.81 kg/m²     Physical Exam  Vitals reviewed.    Constitutional: General: She is not in acute distress. Appearance: Normal appearance. She is well-developed. She is not ill-appearing or diaphoretic. HENT:      Head: Normocephalic and atraumatic. Eyes:      General: No scleral icterus. Conjunctiva/sclera: Conjunctivae normal.      Pupils: Pupils are equal, round, and reactive to light. Cardiovascular:      Rate and Rhythm: Normal rate and regular rhythm. Heart sounds: Normal heart sounds. No murmur heard. No friction rub. No gallop. Pulmonary:      Effort: Pulmonary effort is normal. No respiratory distress. Breath sounds: Normal breath sounds. No wheezing. Chest:      Chest wall: No tenderness. Abdominal:      Palpations: Abdomen is soft. Musculoskeletal:         General: Normal range of motion. Cervical back: Normal range of motion and neck supple. Skin:     General: Skin is warm and dry. Neurological:      Mental Status: She is alert and oriented to person, place, and time. Psychiatric:         Behavior: Behavior normal.         Thought Content: Thought content normal.         Judgment: Judgment normal.         No results found for requested labs within last 30 days.      Hemoglobin A1C (%)   Date Value   04/15/2015 5.6     LDL Calculated (mg/dL)   Date Value   08/28/2020 153 (H)       Lab Results   Component Value Date    WBC 5.7 08/28/2020    WBC 5.9 09/15/2015    WBC 12.7 06/27/2013    NEUTROABS 3.7 09/15/2015    HGB 14.6 08/28/2020    HGB 13.0 09/15/2015    HGB 7.5 06/27/2013    HCT 44.1 08/28/2020    HCT 38.5 09/15/2015    HCT 23.0 06/27/2013    MCV 89.9 08/28/2020    MCV 78 09/15/2015    MCV 77.3 06/27/2013     08/28/2020     09/15/2015     06/27/2013    SEGSABS 9.7 06/27/2013    SEGSABS 10.7 03/27/2013    SEGSABS 7.0 12/04/2011    LYMPHSABS 1.8 09/15/2015    MONOSABS 0.3 09/15/2015    EOSABS 0.1 09/15/2015    BASOSABS 0.0 09/15/2015     Lab Results   Component Value Date    TSH 1.78 08/28/2020     Lab Results   Component Value Date    LABALBU 4.7 08/28/2020    BILITOT 0.3 08/28/2020    AST 25 08/28/2020    ALT 26 08/28/2020    ALKPHOS 80 08/28/2020             No results found for this visit on 06/25/21. ASSESSMENT AND PLAN:     1. Anxiety and depression  - better  - venlafaxine (EFFEXOR XR) 150 MG extended release capsule; TAKE ONE CAPSULE BY MOUTH EVERY DAY  Dispense: 90 capsule; Refill: 1  - traZODone (DESYREL) 50 MG tablet; TAKE 1/2-1 TABLET BY MOUTH AT BEDTIME  Dispense: 90 tablet; Refill: 1    2. Medication refill  - venlafaxine (EFFEXOR XR) 150 MG extended release capsule; TAKE ONE CAPSULE BY MOUTH EVERY DAY  Dispense: 90 capsule; Refill: 1  - traZODone (DESYREL) 50 MG tablet; TAKE 1/2-1 TABLET BY MOUTH AT BEDTIME  Dispense: 90 tablet; Refill: 1    3. Constipation, unspecified constipation type    Fluids, rest  Continue current medication regimen  Continue walking, decrease red meat  Continue to increase fruits and vegetables in diet  Return in August for fasting labs  Verbalized understanding and agreement with plan      Return in about 6 months (around 12/25/2021). Care discussed with patient. Patient educated on signs and symptoms of exacerbation and when to seek further medical attention. Advised to call for any problems, questions, or concerns. Patient verbalizes understanding and agrees with plan. Medications reviewed and reconciled. Continue current medications. Appropriate prescriptions are ordered. Risks and benefits of meds are discussed. After visit summary provided.

## 2021-08-30 ENCOUNTER — NURSE ONLY (OUTPATIENT)
Dept: FAMILY MEDICINE CLINIC | Age: 41
End: 2021-08-30

## 2021-08-30 VITALS
OXYGEN SATURATION: 98 % | HEART RATE: 94 BPM | WEIGHT: 205.4 LBS | TEMPERATURE: 96.7 F | DIASTOLIC BLOOD PRESSURE: 74 MMHG | HEIGHT: 64 IN | BODY MASS INDEX: 35.07 KG/M2 | SYSTOLIC BLOOD PRESSURE: 100 MMHG

## 2021-08-30 DIAGNOSIS — F41.9 ANXIETY AND DEPRESSION: Primary | ICD-10-CM

## 2021-08-30 DIAGNOSIS — F32.A ANXIETY AND DEPRESSION: Primary | ICD-10-CM

## 2021-08-30 DIAGNOSIS — Z13.220 SCREENING FOR CHOLESTEROL LEVEL: ICD-10-CM

## 2021-08-30 LAB
REASON FOR REJECTION: NORMAL
REJECTED TEST: NORMAL

## 2021-09-10 DIAGNOSIS — N92.6 IRREGULAR PERIODS/MENSTRUAL CYCLES: ICD-10-CM

## 2021-09-10 DIAGNOSIS — Z13.6 SCREENING FOR CARDIOVASCULAR CONDITION: ICD-10-CM

## 2021-09-10 DIAGNOSIS — F32.A ANXIETY AND DEPRESSION: Primary | ICD-10-CM

## 2021-09-10 DIAGNOSIS — R45.86 MOOD SWINGS: ICD-10-CM

## 2021-09-10 DIAGNOSIS — F41.9 ANXIETY AND DEPRESSION: Primary | ICD-10-CM
